# Patient Record
Sex: FEMALE | Race: WHITE | NOT HISPANIC OR LATINO
[De-identification: names, ages, dates, MRNs, and addresses within clinical notes are randomized per-mention and may not be internally consistent; named-entity substitution may affect disease eponyms.]

---

## 2017-08-21 ENCOUNTER — APPOINTMENT (OUTPATIENT)
Dept: OPHTHALMOLOGY | Facility: CLINIC | Age: 77
End: 2017-08-21

## 2018-08-01 ENCOUNTER — APPOINTMENT (OUTPATIENT)
Dept: SURGERY | Facility: ASSISTED LIVING FACILITY | Age: 78
End: 2018-08-01
Payer: MEDICARE

## 2018-08-01 PROCEDURE — 99304 1ST NF CARE SF/LOW MDM 25: CPT

## 2018-08-10 ENCOUNTER — APPOINTMENT (OUTPATIENT)
Dept: SURGERY | Facility: ASSISTED LIVING FACILITY | Age: 78
End: 2018-08-10
Payer: MEDICARE

## 2018-08-10 PROCEDURE — 99307 SBSQ NF CARE SF MDM 10: CPT

## 2018-08-15 ENCOUNTER — APPOINTMENT (OUTPATIENT)
Dept: SURGERY | Facility: ASSISTED LIVING FACILITY | Age: 78
End: 2018-08-15
Payer: MEDICARE

## 2018-08-15 PROCEDURE — 99307 SBSQ NF CARE SF MDM 10: CPT

## 2018-09-24 ENCOUNTER — APPOINTMENT (OUTPATIENT)
Dept: NEUROLOGY | Facility: CLINIC | Age: 78
End: 2018-09-24

## 2018-09-26 ENCOUNTER — OUTPATIENT (OUTPATIENT)
Dept: OUTPATIENT SERVICES | Facility: HOSPITAL | Age: 78
LOS: 1 days | End: 2018-09-26
Payer: MEDICARE

## 2018-09-26 ENCOUNTER — APPOINTMENT (OUTPATIENT)
Dept: CT IMAGING | Facility: HOSPITAL | Age: 78
End: 2018-09-26
Payer: MEDICARE

## 2018-09-26 DIAGNOSIS — Z90.710 ACQUIRED ABSENCE OF BOTH CERVIX AND UTERUS: Chronic | ICD-10-CM

## 2018-09-26 DIAGNOSIS — Z95.0 PRESENCE OF CARDIAC PACEMAKER: Chronic | ICD-10-CM

## 2018-09-26 DIAGNOSIS — Z98.89 OTHER SPECIFIED POSTPROCEDURAL STATES: Chronic | ICD-10-CM

## 2018-09-26 DIAGNOSIS — Z00.8 ENCOUNTER FOR OTHER GENERAL EXAMINATION: ICD-10-CM

## 2018-09-26 DIAGNOSIS — Z90.49 ACQUIRED ABSENCE OF OTHER SPECIFIED PARTS OF DIGESTIVE TRACT: Chronic | ICD-10-CM

## 2018-09-26 DIAGNOSIS — Z95.4 PRESENCE OF OTHER HEART-VALVE REPLACEMENT: Chronic | ICD-10-CM

## 2018-09-26 PROCEDURE — 70450 CT HEAD/BRAIN W/O DYE: CPT

## 2018-09-26 PROCEDURE — 70450 CT HEAD/BRAIN W/O DYE: CPT | Mod: 26

## 2019-01-11 ENCOUNTER — NON-APPOINTMENT (OUTPATIENT)
Age: 79
End: 2019-01-11

## 2019-01-11 ENCOUNTER — APPOINTMENT (OUTPATIENT)
Dept: ELECTROPHYSIOLOGY | Facility: CLINIC | Age: 79
End: 2019-01-11
Payer: MEDICARE

## 2019-01-11 VITALS — SYSTOLIC BLOOD PRESSURE: 125 MMHG | HEART RATE: 65 BPM | DIASTOLIC BLOOD PRESSURE: 71 MMHG | RESPIRATION RATE: 14 BRPM

## 2019-01-11 VITALS — OXYGEN SATURATION: 100 % | BODY MASS INDEX: 26.21 KG/M2 | WEIGHT: 130 LBS | HEIGHT: 59 IN

## 2019-01-11 DIAGNOSIS — Z95.0 PRESENCE OF CARDIAC PACEMAKER: ICD-10-CM

## 2019-01-11 DIAGNOSIS — I48.91 UNSPECIFIED ATRIAL FIBRILLATION: ICD-10-CM

## 2019-01-11 DIAGNOSIS — Z45.010 ENCOUNTER FOR CHECKING AND TESTING OF CARDIAC PACEMAKER PULSE GENERATOR [BATTERY]: ICD-10-CM

## 2019-01-11 DIAGNOSIS — Z86.79 PERSONAL HISTORY OF OTHER DISEASES OF THE CIRCULATORY SYSTEM: ICD-10-CM

## 2019-01-11 DIAGNOSIS — I48.2 CHRONIC ATRIAL FIBRILLATION: ICD-10-CM

## 2019-01-11 DIAGNOSIS — I61.9 NONTRAUMATIC INTRACEREBRAL HEMORRHAGE, UNSPECIFIED: ICD-10-CM

## 2019-01-11 PROCEDURE — 99205 OFFICE O/P NEW HI 60 MIN: CPT

## 2019-01-11 PROCEDURE — 93279 PRGRMG DEV EVAL PM/LDLS PM: CPT

## 2019-01-11 PROCEDURE — 93000 ELECTROCARDIOGRAM COMPLETE: CPT | Mod: 59

## 2019-01-11 RX ORDER — UBIDECARENONE/VIT E ACET 100MG-5
CAPSULE ORAL
Refills: 0 | Status: ACTIVE | COMMUNITY

## 2019-01-11 RX ORDER — TRAMADOL HYDROCHLORIDE 25 MG/1
TABLET, COATED ORAL
Refills: 0 | Status: ACTIVE | COMMUNITY

## 2019-01-12 NOTE — DISCUSSION/SUMMARY
[FreeTextEntry1] : In summary, Dariana Galeana is a 79y/o woman with Hx of HTN, HLD, hypothyroidism, rheumatic fever, bioprosthetic AVR and MVR, annuloplasty ring in the tricuspid valve position (TV repair), normal LV function, epicardial pacemaker implant at time of MVR which has since been changed and lead capped, and permanent atrial fibrillation, not on AC secondary to cerebral hemorrhage (4/2018- was previously on warfarin), who presents today for initial evaluation and device check as PPM has reached DONNA as of 10/26/2018. Admits doing well, was residing in a Nursing Home secondary to her CVA. Has been off warfarin since that time and is following with Neuro surgeon. Residual right sided weakness leaving her wheelchair bound, needs assistance for ADLs. From a cardiac standpoint she feels well. Denies chest pain, palpitations, SOB, syncope or near syncope. EKG today shows atrial fibrillation with ventricular pacing. V pacing > 70% of the time although ECHO performed yesterday without indication for upgrade. Recommend undergoing routine single chamber PPM gen change. Risks, benefits, and alternatives to procedure discussed at length. Also expressed interest in Watchman procedure. Will be seeing Neuro surgery to discuss clearance for use of short term AC and ASA/clopidogrel. If clearance provided, will discuss Watchman procedure further. \par \par Sincerely,\par \par Aram Duncan MD

## 2019-01-12 NOTE — HISTORY OF PRESENT ILLNESS
[FreeTextEntry1] : Eugene Rubio MD\par \par I saw Dariana Galeana on January 11, 2019. As you know, she is a 79y/o woman with Hx of HTN, HLD, hypothyroidism, rheumatic fever, bioprosthetic AVR and MVR, annuloplasty ring in the tricuspid valve position (TV repair), normal LV function, epicardial pacemaker implant at time of MVR which has since been changed and lead capped, and permanent atrial fibrillation, not on AC secondary to cerebral hemorrhage (4/2018- was previously on warfarin), who presents today for initial evaluation and device check as PPM has reached DONNA as of 10/26/2018. Admits doing well, was residing in a Nursing Home secondary to her CVA. Has been off warfarin since that time and is following with Neuro surgeon. Residual right sided weakness leaving her wheelchair bound, needs assistance for ADLs. From a cardiac standpoint she feels well. Denies chest pain, palpitations, SOB, syncope or near syncope.

## 2019-01-12 NOTE — PHYSICAL EXAM
[Heart Sounds] : normal S1 and S2 [Respiration, Rhythm And Depth] : normal respiratory rhythm and effort [Exaggerated Use Of Accessory Muscles For Inspiration] : no accessory muscle use [General Appearance - Well Developed] : well developed [Normal Appearance] : normal appearance [Well Groomed] : well groomed [General Appearance - Well Nourished] : well nourished [No Deformities] : no deformities [General Appearance - In No Acute Distress] : no acute distress [Normal Conjunctiva] : the conjunctiva exhibited no abnormalities [Eyelids - No Xanthelasma] : the eyelids demonstrated no xanthelasmas [Normal Oral Mucosa] : normal oral mucosa [No Oral Pallor] : no oral pallor [No Oral Cyanosis] : no oral cyanosis [Normal Jugular Venous A Waves Present] : normal jugular venous A waves present [Normal Jugular Venous V Waves Present] : normal jugular venous V waves present [No Jugular Venous Serrano A Waves] : no jugular venous serrano A waves [Abdomen Soft] : soft [Abdomen Tenderness] : non-tender [Abdomen Mass (___ Cm)] : no abdominal mass palpated [Abnormal Walk] : normal gait [Gait - Sufficient For Exercise Testing] : the gait was sufficient for exercise testing [Nail Clubbing] : no clubbing of the fingernails [Cyanosis, Localized] : no localized cyanosis [Petechial Hemorrhages (___cm)] : no petechial hemorrhages [Skin Color & Pigmentation] : normal skin color and pigmentation [] : no rash [No Venous Stasis] : no venous stasis [Skin Lesions] : no skin lesions [No Skin Ulcers] : no skin ulcer [No Xanthoma] : no  xanthoma was observed [Oriented To Time, Place, And Person] : oriented to person, place, and time [Affect] : the affect was normal [Mood] : the mood was normal [No Anxiety] : not feeling anxious [FreeTextEntry1] : diminished at bases

## 2019-01-25 ENCOUNTER — APPOINTMENT (OUTPATIENT)
Dept: ELECTROPHYSIOLOGY | Facility: CLINIC | Age: 79
End: 2019-01-25
Payer: MEDICARE

## 2019-01-25 PROCEDURE — 36415 COLL VENOUS BLD VENIPUNCTURE: CPT

## 2019-01-27 LAB
ALBUMIN SERPL ELPH-MCNC: 4.5 G/DL
ALP BLD-CCNC: 70 U/L
ALT SERPL-CCNC: 11 U/L
ANION GAP SERPL CALC-SCNC: 17 MMOL/L
AST SERPL-CCNC: 16 U/L
BASOPHILS # BLD AUTO: 0.04 K/UL
BASOPHILS NFR BLD AUTO: 0.5 %
BILIRUB SERPL-MCNC: 0.8 MG/DL
BUN SERPL-MCNC: 14 MG/DL
CALCIUM SERPL-MCNC: 10 MG/DL
CHLORIDE SERPL-SCNC: 95 MMOL/L
CO2 SERPL-SCNC: 28 MMOL/L
CREAT SERPL-MCNC: 1.01 MG/DL
EOSINOPHIL # BLD AUTO: 0.33 K/UL
EOSINOPHIL NFR BLD AUTO: 4.3 %
HCT VFR BLD CALC: 43.4 %
HGB BLD-MCNC: 13.6 G/DL
IMM GRANULOCYTES NFR BLD AUTO: 0.5 %
LYMPHOCYTES # BLD AUTO: 1.3 K/UL
LYMPHOCYTES NFR BLD AUTO: 17 %
MAN DIFF?: NORMAL
MCHC RBC-ENTMCNC: 30.2 PG
MCHC RBC-ENTMCNC: 31.3 GM/DL
MCV RBC AUTO: 96.2 FL
MONOCYTES # BLD AUTO: 0.59 K/UL
MONOCYTES NFR BLD AUTO: 7.7 %
NEUTROPHILS # BLD AUTO: 5.35 K/UL
NEUTROPHILS NFR BLD AUTO: 70 %
PLATELET # BLD AUTO: 362 K/UL
POTASSIUM SERPL-SCNC: 4.1 MMOL/L
PROT SERPL-MCNC: 7.7 G/DL
RBC # BLD: 4.51 M/UL
RBC # FLD: 14.1 %
SODIUM SERPL-SCNC: 140 MMOL/L
WBC # FLD AUTO: 7.65 K/UL

## 2019-01-30 ENCOUNTER — INPATIENT (INPATIENT)
Facility: HOSPITAL | Age: 79
LOS: 1 days | Discharge: HOME HEALTH | End: 2019-02-01
Attending: INTERNAL MEDICINE | Admitting: INTERNAL MEDICINE
Payer: MEDICARE

## 2019-01-30 VITALS
SYSTOLIC BLOOD PRESSURE: 119 MMHG | OXYGEN SATURATION: 97 % | TEMPERATURE: 98 F | RESPIRATION RATE: 18 BRPM | HEART RATE: 71 BPM | DIASTOLIC BLOOD PRESSURE: 53 MMHG

## 2019-01-30 DIAGNOSIS — Z90.49 ACQUIRED ABSENCE OF OTHER SPECIFIED PARTS OF DIGESTIVE TRACT: Chronic | ICD-10-CM

## 2019-01-30 DIAGNOSIS — Z98.89 OTHER SPECIFIED POSTPROCEDURAL STATES: Chronic | ICD-10-CM

## 2019-01-30 DIAGNOSIS — I48.2 CHRONIC ATRIAL FIBRILLATION: ICD-10-CM

## 2019-01-30 DIAGNOSIS — Z90.710 ACQUIRED ABSENCE OF BOTH CERVIX AND UTERUS: Chronic | ICD-10-CM

## 2019-01-30 DIAGNOSIS — Z95.4 PRESENCE OF OTHER HEART-VALVE REPLACEMENT: Chronic | ICD-10-CM

## 2019-01-30 DIAGNOSIS — Z95.0 PRESENCE OF CARDIAC PACEMAKER: Chronic | ICD-10-CM

## 2019-01-30 PROCEDURE — 93010 ELECTROCARDIOGRAM REPORT: CPT | Mod: 76

## 2019-01-30 PROCEDURE — 33228 REMV&REPLC PM GEN DUAL LEAD: CPT

## 2019-01-30 RX ORDER — SODIUM CHLORIDE 9 MG/ML
3 INJECTION INTRAMUSCULAR; INTRAVENOUS; SUBCUTANEOUS EVERY 8 HOURS
Qty: 0 | Refills: 0 | Status: DISCONTINUED | OUTPATIENT
Start: 2019-01-30 | End: 2019-02-01

## 2019-01-30 RX ORDER — VANCOMYCIN HCL 1 G
1000 VIAL (EA) INTRAVENOUS ONCE
Qty: 0 | Refills: 0 | Status: COMPLETED | OUTPATIENT
Start: 2019-01-31 | End: 2019-01-31

## 2019-01-30 RX ORDER — DIGOXIN 250 MCG
0.12 TABLET ORAL DAILY
Qty: 0 | Refills: 0 | Status: DISCONTINUED | OUTPATIENT
Start: 2019-01-30 | End: 2019-02-01

## 2019-01-30 RX ORDER — FUROSEMIDE 40 MG
40 TABLET ORAL DAILY
Qty: 0 | Refills: 0 | Status: DISCONTINUED | OUTPATIENT
Start: 2019-01-30 | End: 2019-02-01

## 2019-01-30 RX ORDER — ZOLPIDEM TARTRATE 10 MG/1
5 TABLET ORAL AT BEDTIME
Qty: 0 | Refills: 0 | Status: DISCONTINUED | OUTPATIENT
Start: 2019-01-30 | End: 2019-02-01

## 2019-01-30 RX ADMIN — SODIUM CHLORIDE 3 MILLILITER(S): 9 INJECTION INTRAMUSCULAR; INTRAVENOUS; SUBCUTANEOUS at 21:39

## 2019-01-30 RX ADMIN — SODIUM CHLORIDE 3 MILLILITER(S): 9 INJECTION INTRAMUSCULAR; INTRAVENOUS; SUBCUTANEOUS at 17:25

## 2019-01-30 RX ADMIN — ZOLPIDEM TARTRATE 5 MILLIGRAM(S): 10 TABLET ORAL at 21:39

## 2019-01-30 NOTE — CHART NOTE - NSCHARTNOTEFT_GEN_A_CORE
Patient is s/p PPM generator change. Device site with no bleeding or hematoma. Device teaching given to khanh and she demonstrates understanding of the instructions. F/U appointment with device clinic on 2/12/19 @ 2:15 pm

## 2019-01-30 NOTE — CHART NOTE - NSCHARTNOTEFT_GEN_A_CORE
Type of Procedure: pacemaker generator change (single chamber)  Licensed independent practitioner: Aram Duncan MD  Assistant: none  Description of procedure: sterile conditions, antibiotic coverage, old pacemaker removed, pocket copiously irrigated with antibiotic solution, new pacemaker implanted and pocket closed in 3 layers.   Findings of procedure: normal pacing, sensing and lead integrity  Estimated blood loss: < 10 cc  Specimen removed: old battery depleted pacemaker  Preoperative Dx: AV block; PM at DONNA  Postoperative Dx: AV block; PM at DONNA; pacemaker replaced  Complications: none  Anesthesia type: local anesthesia with sedation  No heparin for 24 hours and then reassess.    Aram Duncan MD.

## 2019-01-30 NOTE — H&P CARDIOLOGY - FAMILY HISTORY
Father  Still living? No  Family history of rheumatic fever, Age at diagnosis: Age Unknown     Mother  Still living? No  Family history of stroke, Age at diagnosis: Age Unknown

## 2019-01-30 NOTE — H&P CARDIOLOGY - PSH
Artificial cardiac pacemaker  2009.  Pacemaker card on chart  S/P cholecystectomy    S/P hysterectomy    S/P mitral valve repair  1988  S/P mitral valve replacement with tissue valve  bovine 2009  At same time aortic and tricuspid valves were replaced.

## 2019-01-30 NOTE — H&P CARDIOLOGY - HISTORY OF PRESENT ILLNESS
Patient is a 78 year old woman that arrived today for PPM generator change. Please see full hard copy H&P in chart.

## 2019-01-30 NOTE — H&P CARDIOLOGY - PMH
Abnormal Pap smear of vagina  treated with partial LISA  Adult Hypothyroidism    Arthritis    Atrial Fibrillation    Bradycardia    CHF (congestive heart failure), NYHA class II, chronic, systolic    Hip pain, acute, right    HTN    Rheumatic fever with heart involvement    Transient cerebral ischemia, unspecified type  symptom is vertigo, no residual. Most recent TIA 2/2016

## 2019-01-30 NOTE — CHART NOTE - NSCHARTNOTEFT_GEN_A_CORE
Patient seen and examined   T(C): 36.5 (01-30-19 @ 21:00), Max: 36.7 (01-30-19 @ 19:09)  HR: 71 (01-30-19 @ 21:00) (71 - 71)  BP: 108/50 (01-30-19 @ 21:00) (108/50 - 119/53)  RR: 18 (01-30-19 @ 21:00) (18 - 18)  SpO2: 96% (01-30-19 @ 21:00) (96% - 97%)    Patient s/p PPM generator change / implant.   Patient denies any complaints.    Left subclavicular incision site stable. Dressing clean, dry and intact. No hematoma or active bleeding noted.     Patient hemodynamically stable will continue to monitor

## 2019-01-31 ENCOUNTER — TRANSCRIPTION ENCOUNTER (OUTPATIENT)
Age: 79
End: 2019-01-31

## 2019-01-31 LAB
ANION GAP SERPL CALC-SCNC: 14 MMO/L — SIGNIFICANT CHANGE UP (ref 7–14)
BUN SERPL-MCNC: 18 MG/DL — SIGNIFICANT CHANGE UP (ref 7–23)
CALCIUM SERPL-MCNC: 9.7 MG/DL — SIGNIFICANT CHANGE UP (ref 8.4–10.5)
CHLORIDE SERPL-SCNC: 98 MMOL/L — SIGNIFICANT CHANGE UP (ref 98–107)
CO2 SERPL-SCNC: 24 MMOL/L — SIGNIFICANT CHANGE UP (ref 22–31)
CREAT SERPL-MCNC: 0.96 MG/DL — SIGNIFICANT CHANGE UP (ref 0.5–1.3)
GLUCOSE BLDC GLUCOMTR-MCNC: 138 MG/DL — HIGH (ref 70–99)
GLUCOSE SERPL-MCNC: 110 MG/DL — HIGH (ref 70–99)
HCT VFR BLD CALC: 37.7 % — SIGNIFICANT CHANGE UP (ref 34.5–45)
HGB BLD-MCNC: 12 G/DL — SIGNIFICANT CHANGE UP (ref 11.5–15.5)
MAGNESIUM SERPL-MCNC: 2.2 MG/DL — SIGNIFICANT CHANGE UP (ref 1.6–2.6)
MCHC RBC-ENTMCNC: 30.3 PG — SIGNIFICANT CHANGE UP (ref 27–34)
MCHC RBC-ENTMCNC: 31.8 % — LOW (ref 32–36)
MCV RBC AUTO: 95.2 FL — SIGNIFICANT CHANGE UP (ref 80–100)
NRBC # FLD: 0 K/UL — LOW (ref 25–125)
PLATELET # BLD AUTO: 278 K/UL — SIGNIFICANT CHANGE UP (ref 150–400)
PMV BLD: 10.2 FL — SIGNIFICANT CHANGE UP (ref 7–13)
POTASSIUM SERPL-MCNC: 4.9 MMOL/L — SIGNIFICANT CHANGE UP (ref 3.5–5.3)
POTASSIUM SERPL-SCNC: 4.9 MMOL/L — SIGNIFICANT CHANGE UP (ref 3.5–5.3)
RBC # BLD: 3.96 M/UL — SIGNIFICANT CHANGE UP (ref 3.8–5.2)
RBC # FLD: 12.8 % — SIGNIFICANT CHANGE UP (ref 10.3–14.5)
SODIUM SERPL-SCNC: 136 MMOL/L — SIGNIFICANT CHANGE UP (ref 135–145)
WBC # BLD: 12.35 K/UL — HIGH (ref 3.8–10.5)
WBC # FLD AUTO: 12.35 K/UL — HIGH (ref 3.8–10.5)

## 2019-01-31 RX ORDER — DIGOXIN 250 MCG
1 TABLET ORAL
Qty: 30 | Refills: 0 | OUTPATIENT
Start: 2019-01-31 | End: 2019-03-01

## 2019-01-31 RX ORDER — FUROSEMIDE 40 MG
1 TABLET ORAL
Qty: 0 | Refills: 0 | COMMUNITY
Start: 2019-01-31

## 2019-01-31 RX ORDER — FUROSEMIDE 40 MG
1 TABLET ORAL
Qty: 30 | Refills: 0 | OUTPATIENT
Start: 2019-01-31 | End: 2019-03-01

## 2019-01-31 RX ORDER — DIGOXIN 250 MCG
1 TABLET ORAL
Qty: 0 | Refills: 0 | COMMUNITY
Start: 2019-01-31

## 2019-01-31 RX ORDER — ACETAMINOPHEN 500 MG
2 TABLET ORAL
Qty: 0 | Refills: 0 | COMMUNITY

## 2019-01-31 RX ADMIN — ZOLPIDEM TARTRATE 5 MILLIGRAM(S): 10 TABLET ORAL at 21:22

## 2019-01-31 RX ADMIN — SODIUM CHLORIDE 3 MILLILITER(S): 9 INJECTION INTRAMUSCULAR; INTRAVENOUS; SUBCUTANEOUS at 21:23

## 2019-01-31 RX ADMIN — Medication 250 MILLIGRAM(S): at 02:05

## 2019-01-31 RX ADMIN — Medication 40 MILLIGRAM(S): at 05:59

## 2019-01-31 RX ADMIN — SODIUM CHLORIDE 3 MILLILITER(S): 9 INJECTION INTRAMUSCULAR; INTRAVENOUS; SUBCUTANEOUS at 05:59

## 2019-01-31 RX ADMIN — Medication 0.12 MILLIGRAM(S): at 05:59

## 2019-01-31 RX ADMIN — SODIUM CHLORIDE 3 MILLILITER(S): 9 INJECTION INTRAMUSCULAR; INTRAVENOUS; SUBCUTANEOUS at 13:00

## 2019-01-31 NOTE — PROGRESS NOTE ADULT - ASSESSMENT
78 year old female with a PMH of permanent AFib-off Warfarin due to cerebral hemorrhage 4/2018, HTN, HLD, hypothyroidism, rheumatic fever, bioprosthetic AVR/MVR, annuloplasty ring in tricuspid valve, epicardial pacemaker at time of MVR with lead change presented for elective generator change.  s/p Medtronic PPM generator change.  Patient's [id number] is 6, however her anticoagulation therapy was discontinued secondary to cerebral hemorrhage in 2018.  -Post device teaching done  - Follow up in the device clinic on 2/12 at 2:15pm.  Oncology Building 4 th Floor at [facility]  [phone].    -Continue home meds  -Follow up with her neuro surgery to discuss clearance for use of short term anticoagulation therapy and ASA/Plavix for WATCHMAN procedure  -Likely discharge home tonight 78 year old female with a PMH of permanent AFib-off Warfarin due to cerebral hemorrhage 4/2018, HTN, HLD, hypothyroidism, rheumatic fever, bioprosthetic AVR/MVR, annuloplasty ring in tricuspid valve, epicardial pacemaker at time of MVR with lead change presented for elective generator change.  s/p Medtronic PPM generator change.  Patient's [id number] is 6, however her anticoagulation therapy was discontinued secondary to cerebral hemorrhage in 2018.  -Post device teaching done  - Follow up in the device clinic on 2/12 at 2:15pm.  Oncology Building 4 th Floor at [facility]  [phone].    -Continue home meds  -Follow up with her neuro surgery as an outpatient to discuss clearance for use of short term anticoagulation therapy and ASA/Plavix for WATCHMAN procedure  -Stable for discharge home tonight per Dr. Duncan

## 2019-01-31 NOTE — DISCHARGE NOTE ADULT - PATIENT PORTAL LINK FT
You can access the "[employer] Patient Portal, offered by [facility], by registering with the following website: [email or web address]

## 2019-01-31 NOTE — DISCHARGE NOTE ADULT - ADDITIONAL INSTRUCTIONS
Follow with PMD dr Duncan within 1 week. Call to confirm appointment for device clinic on  2/12/19 at 215 pm. Call [phone] to confirm appointment  Take medications as prescribed Follow with PMD dr Duncan within 1 week. Call to confirm appointment for device clinic on  2/12/19 at 215 pm. Call [phone] to confirm appointment  Take medications as prescribed  Follow up with a neurosurgeon as outpatient. You need to discuss the use of anticoagulation and use of Aspirin and Plavix for watchman procedure.

## 2019-01-31 NOTE — DISCHARGE NOTE ADULT - MEDICATION SUMMARY - MEDICATIONS TO TAKE
I will START or STAY ON the medications listed below when I get home from the hospital:    Tylenol 325 mg oral capsule  -- 2 cap(s) by mouth every 6 hours, As Needed  -- Indication: For pain    digoxin 125 mcg (0.125 mg) oral tablet  -- 1 tab(s) by mouth once a day  -- Indication: For Chronic atrial fibrillation    zolpidem 5 mg oral tablet  -- 1 tab(s) by mouth once a day (at bedtime)  -- Indication: For sleep    furosemide 40 mg oral tablet  -- 1 tab(s) by mouth once a day  -- Indication: For ChF

## 2019-01-31 NOTE — DISCHARGE NOTE ADULT - MEDICATION SUMMARY - MEDICATIONS TO STOP TAKING
I will STOP taking the medications listed below when I get home from the hospital:    traMADol 50 mg oral tablet  -- 1 tab(s) by mouth every 6 hours, As needed, Severe Pain (7 - 10)

## 2019-01-31 NOTE — DISCHARGE NOTE ADULT - HOSPITAL COURSE
Patient is a 78 year old woman with PMHx of HTN,HLD, hypothyroid, biprosthetic AVR and MVR annuloplasty ring in tricuspid valve position arrived today for PPM generator change   Pacemaker generator change done 1/30/19.

## 2019-01-31 NOTE — CHART NOTE - NSCHARTNOTEFT_GEN_A_CORE
Telemetry review done: NSR without recurrent SVT seen.  Patient will be provided a follow up with Dr. Koch upon discharge.

## 2019-01-31 NOTE — DISCHARGE NOTE ADULT - CARE PROVIDER_API CALL
Aram Duncan (MD)  Cardiac Electrophysiology; Cardiovascular Disease; Internal Medicine  [address] [address] [place] [address], [address] [place] [address]  Phone: [phone]  Fax: [phone]

## 2019-01-31 NOTE — DISCHARGE NOTE ADULT - SECONDARY DIAGNOSIS.
HTN (hypertension) CHF (congestive heart failure), NYHA class II, chronic, systolic Arthritis Transient cerebral ischemia, unspecified type Atrial fibrillation Adult hypothyroidism

## 2019-01-31 NOTE — DISCHARGE NOTE ADULT - CARE PLAN
Principal Discharge DX:	Bradycardia  Goal:	satisfactory heart rate  Assessment and plan of treatment:	Follow up with PMD within 1 week. Call for appointment  Secondary Diagnosis:	Adult hypothyroidism  Assessment and plan of treatment:	Follow with PMD  Secondary Diagnosis:	HTN (hypertension)  Assessment and plan of treatment:	Follow with PMD  Secondary Diagnosis:	CHF (congestive heart failure), NYHA class II, chronic, systolic  Assessment and plan of treatment:	Follow with PMD  Secondary Diagnosis:	Arthritis  Assessment and plan of treatment:	Follow with PMD  Secondary Diagnosis:	Transient cerebral ischemia, unspecified type  Assessment and plan of treatment:	Follow with PMD  Secondary Diagnosis:	Atrial fibrillation  Assessment and plan of treatment:	Follow with PMD

## 2019-02-01 VITALS
RESPIRATION RATE: 18 BRPM | DIASTOLIC BLOOD PRESSURE: 80 MMHG | SYSTOLIC BLOOD PRESSURE: 130 MMHG | TEMPERATURE: 98 F | OXYGEN SATURATION: 99 % | HEART RATE: 85 BPM

## 2019-02-01 LAB
ANION GAP SERPL CALC-SCNC: 15 MMO/L — HIGH (ref 7–14)
BUN SERPL-MCNC: 21 MG/DL — SIGNIFICANT CHANGE UP (ref 7–23)
CALCIUM SERPL-MCNC: 9.7 MG/DL — SIGNIFICANT CHANGE UP (ref 8.4–10.5)
CHLORIDE SERPL-SCNC: 97 MMOL/L — LOW (ref 98–107)
CO2 SERPL-SCNC: 29 MMOL/L — SIGNIFICANT CHANGE UP (ref 22–31)
CREAT SERPL-MCNC: 1.06 MG/DL — SIGNIFICANT CHANGE UP (ref 0.5–1.3)
GLUCOSE SERPL-MCNC: 98 MG/DL — SIGNIFICANT CHANGE UP (ref 70–99)
HCT VFR BLD CALC: 39.8 % — SIGNIFICANT CHANGE UP (ref 34.5–45)
HGB BLD-MCNC: 13.2 G/DL — SIGNIFICANT CHANGE UP (ref 11.5–15.5)
MCHC RBC-ENTMCNC: 30.6 PG — SIGNIFICANT CHANGE UP (ref 27–34)
MCHC RBC-ENTMCNC: 33.2 % — SIGNIFICANT CHANGE UP (ref 32–36)
MCV RBC AUTO: 92.3 FL — SIGNIFICANT CHANGE UP (ref 80–100)
NRBC # FLD: 0 K/UL — LOW (ref 25–125)
PLATELET # BLD AUTO: 305 K/UL — SIGNIFICANT CHANGE UP (ref 150–400)
PMV BLD: 10.4 FL — SIGNIFICANT CHANGE UP (ref 7–13)
POTASSIUM SERPL-MCNC: 3.6 MMOL/L — SIGNIFICANT CHANGE UP (ref 3.5–5.3)
POTASSIUM SERPL-SCNC: 3.6 MMOL/L — SIGNIFICANT CHANGE UP (ref 3.5–5.3)
RBC # BLD: 4.31 M/UL — SIGNIFICANT CHANGE UP (ref 3.8–5.2)
RBC # FLD: 13.2 % — SIGNIFICANT CHANGE UP (ref 10.3–14.5)
SODIUM SERPL-SCNC: 141 MMOL/L — SIGNIFICANT CHANGE UP (ref 135–145)
WBC # BLD: 8.84 K/UL — SIGNIFICANT CHANGE UP (ref 3.8–10.5)
WBC # FLD AUTO: 8.84 K/UL — SIGNIFICANT CHANGE UP (ref 3.8–10.5)

## 2019-02-01 PROCEDURE — 70450 CT HEAD/BRAIN W/O DYE: CPT | Mod: 26

## 2019-02-01 RX ADMIN — SODIUM CHLORIDE 3 MILLILITER(S): 9 INJECTION INTRAMUSCULAR; INTRAVENOUS; SUBCUTANEOUS at 05:29

## 2019-02-01 RX ADMIN — Medication 0.12 MILLIGRAM(S): at 05:30

## 2019-02-01 RX ADMIN — Medication 40 MILLIGRAM(S): at 05:30

## 2019-02-01 NOTE — PROGRESS NOTE ADULT - SUBJECTIVE AND OBJECTIVE BOX
Patient complaining of dizziness this am. Patient confused and disoriented this am. Patient states that she had symptoms like this when she had her previous CVA. Patient states she had these symptoms like this yesterday also. Spoke with EPS regarding symptoms. Will obtain CT scan head to r/o pathology prior to discharge later today.

## 2019-02-01 NOTE — PROGRESS NOTE ADULT - SUBJECTIVE AND OBJECTIVE BOX
Patient is a 78y old  Female who presents with a chief complaint of PPM generator change (31 Jan 2019 17:02)  Patient reports she had an argument with one of staff this am.  Fox staley RN reports patient was confused during the night.  Patient tried to call police.  Alert oriented x3. No  Denies SOB/CP/palpitations. Patient claims she takes Ambien 2mg every night for insomnia.  No acute agitations at present time.     PAST MEDICAL & SURGICAL HISTORY:  Arthritis  Hip pain, acute, right  Abnormal Pap smear of vagina: treated with partial LISA  CHF (congestive heart failure), NYHA class II, chronic, systolic  Transient cerebral ischemia, unspecified type: symptom is vertigo, no residual. Most recent TIA 2/2016  Bradycardia  Rheumatic fever with heart involvement  s/p Mitral Valve Repair: 1988  H/O: Hysterectomy  History of Cholecystectomy  History of Cholecystectomy  s/p Mitral Valve Repair  H/O: Hysterectomy  History of Cholecystectomy  FH: Mitral Valve Repair  H/O: Hysterectomy  Adult Hypothyroidism  Atrial Fibrillation  HTN  Artificial cardiac pacemaker: 2009.  Pacemaker card on chart  S/P cholecystectomy  S/P mitral valve replacement with tissue valve: bovine 2009  At same time aortic and tricuspid valves were replaced.  S/P mitral valve repair: 1988  S/P hysterectomy      MEDICATIONS  (STANDING):  digoxin     Tablet 0.125 milliGRAM(s) Oral daily  furosemide    Tablet 40 milliGRAM(s) Oral daily  sodium chloride 0.9% lock flush 3 milliLiter(s) IV Push every 8 hours    MEDICATIONS  (PRN):  zolpidem 5 milliGRAM(s) Oral at bedtime PRN Insomnia            Vital Signs Last 24 Hrs  T(C): 36.5 (01 Feb 2019 05:28), Max: 37.1 (31 Jan 2019 11:37)  T(F): 97.7 (01 Feb 2019 05:28), Max: 98.8 (31 Jan 2019 11:37)  HR: 79 (01 Feb 2019 05:28) (74 - 79)  BP: 127/75 (01 Feb 2019 05:28) (108/59 - 145/83)  BP(mean): --  RR: 18 (01 Feb 2019 05:28) (16 - 18)  SpO2: 99% (01 Feb 2019 05:28) (96% - 99%)            INTERPRETATION OF TELEMETRY:  AF V paced 70's bpm    ECG:        LABS:                        13.2   8.84  )-----------( 305      ( 01 Feb 2019 06:30 )             39.8     02-01    141  |  97<L>  |  21  ----------------------------<  98  3.6   |  29  |  1.06    Ca    9.7      01 Feb 2019 06:30  Mg     2.2     01-31                BNP  RADIOLOGY & ADDITIONAL STUDIES:      PHYSICAL EXAM:    GENERAL: In no apparent distress, well nourished, and hydrated.  NECK: Supple and normal thyroid.  No JVD or carotid bruit.  Carotid pulse is 2+ bilaterally.  HEART: S1S2 irregularly irregular ; 2/6 murmurs, no rubs, or gallops.  Left side ACW incision site with steri strips dry intact, no active bleeding or hematoma   PULMONARY: Clear to auscultation and perfusion.  No rales, wheezing, or rhonchi bilaterally.  ABDOMEN: Soft, Nontender, Nondistended; Bowel sounds present  EXTREMITIES:  2+ Peripheral Pulses, No clubbing, cyanosis, or edema  NEUROLOGICAL: alert oriented x3, +R side weakness, baseline speech not fluent from old CVA

## 2019-02-01 NOTE — PROGRESS NOTE ADULT - ASSESSMENT
78 year old female with a PMH of permanent AFib-off Warfarin due to cerebral hemorrhage 4/2018, HTN, HLD, hypothyroidism, rheumatic fever, bioprosthetic AVR/MVR, annuloplasty ring in tricuspid valve, epicardial pacemaker at time of MVR with lead change presented for elective generator change.  s/p Medtronic PPM generator change.  Patient's [id number] is 6, however her anticoagulation therapy was discontinued secondary to cerebral hemorrhage in 2018.  Discharge was postponed-family unable to pick her up yesterday.  Overnight patient developed delirium, possibly associated with Ambien use.  No acute neurological deficit seen compare 1/31.   -CT of head to r/o acute pathology  - Follow up in the device clinic on 2/12 at 2:15pm.  Oncology Building 4 th Floor at [facility]  [phone].    -Continue home meds, hold off Ambien   -Follow up with her neuro surgery as an outpatient to discuss clearance for use of short term anticoagulation therapy and ASA/Plavix for WATCHMAN procedure  -Plan for discharge home today after CT of head

## 2019-02-12 ENCOUNTER — APPOINTMENT (OUTPATIENT)
Dept: ELECTROPHYSIOLOGY | Facility: CLINIC | Age: 79
End: 2019-02-12

## 2019-02-20 NOTE — PROGRESS NOTE ADULT - SUBJECTIVE AND OBJECTIVE BOX
Patient is a 78y old  Female who presents with a chief complaint of PPM generator change (31 Jan 2019 12:30)    Mild PPM wound site pain.  Expressed "feeling confused" with able to explain why.  She is worried about her stroke risk as she is not on anticoagulation therapy for AFib.    PAST MEDICAL & SURGICAL HISTORY:  Arthritis  Hip pain, acute, right  Abnormal Pap smear of vagina: treated with partial LISA  CHF (congestive heart failure), NYHA class II, chronic, systolic  Transient cerebral ischemia, unspecified type: symptom is vertigo, no residual. Most recent TIA 2/2016  Bradycardia  Rheumatic fever with heart involvement  s/p Mitral Valve Repair: 1988  H/O: Hysterectomy  History of Cholecystectomy  History of Cholecystectomy  s/p Mitral Valve Repair  H/O: Hysterectomy  History of Cholecystectomy  FH: Mitral Valve Repair  H/O: Hysterectomy  Adult Hypothyroidism  Atrial Fibrillation  HTN  Artificial cardiac pacemaker: 2009.  Pacemaker card on chart  S/P cholecystectomy  S/P mitral valve replacement with tissue valve: bovine 2009  At same time aortic and tricuspid valves were replaced.  S/P mitral valve repair: 1988  S/P hysterectomy      MEDICATIONS  (STANDING):  digoxin     Tablet 0.125 milliGRAM(s) Oral daily  furosemide    Tablet 40 milliGRAM(s) Oral daily  sodium chloride 0.9% lock flush 3 milliLiter(s) IV Push every 8 hours    MEDICATIONS  (PRN):  zolpidem 5 milliGRAM(s) Oral at bedtime PRN Insomnia            Vital Signs Last 24 Hrs  T(C): 36.8 (31 Jan 2019 15:48), Max: 37.1 (31 Jan 2019 11:37)  T(F): 98.2 (31 Jan 2019 15:48), Max: 98.8 (31 Jan 2019 11:37)  HR: 76 (31 Jan 2019 15:48) (71 - 76)  BP: 145/83 (31 Jan 2019 15:48) (108/50 - 145/83)  BP(mean): --  RR: 17 (31 Jan 2019 15:48) (17 - 18)  SpO2: 97% (31 Jan 2019 15:48) (96% - 99%)            INTERPRETATION OF TELEMETRY: AFib with V paced 70's bpm    ECG: V paced        LABS:                        12.0   12.35 )-----------( 278      ( 31 Jan 2019 06:05 )             37.7     01-31    136  |  98  |  18  ----------------------------<  110<H>  4.9   |  24  |  0.96    Ca    9.7      31 Jan 2019 06:05  Mg     2.2     01-31                BNP  RADIOLOGY & ADDITIONAL STUDIES:      PHYSICAL EXAM:    GENERAL: In no apparent distress, well nourished, and hydrated.  NECK: Supple and normal thyroid.  No JVD or carotid bruit.  Carotid pulse is 2+ bilaterally.  HEART: S1S2 irregularly irregular ; 2/6 systolic murmurs, no rubs, or gallops. Left side ACW incision site with steri strips dry intact, no active bleeding or hematoma   PULMONARY: Clear to auscultation and perfusion.  No rales, wheezing, or rhonchi bilaterally.  ABDOMEN: Soft, Nontender, Nondistended; Bowel sounds present  EXTREMITIES:  2+ Peripheral Pulses, No clubbing, cyanosis, or edema  NEUROLOGICAL: axo x3, mild R side weakness sister/Family

## 2019-03-29 ENCOUNTER — APPOINTMENT (OUTPATIENT)
Dept: NEUROLOGY | Facility: CLINIC | Age: 79
End: 2019-03-29
Payer: MEDICARE

## 2019-03-29 VITALS
DIASTOLIC BLOOD PRESSURE: 84 MMHG | HEART RATE: 82 BPM | BODY MASS INDEX: 26.21 KG/M2 | SYSTOLIC BLOOD PRESSURE: 138 MMHG | HEIGHT: 59 IN | WEIGHT: 130 LBS

## 2019-03-29 DIAGNOSIS — Z78.9 OTHER SPECIFIED HEALTH STATUS: ICD-10-CM

## 2019-03-29 DIAGNOSIS — I09.9 RHEUMATIC HEART DISEASE, UNSPECIFIED: ICD-10-CM

## 2019-03-29 DIAGNOSIS — Z82.49 FAMILY HISTORY OF ISCHEMIC HEART DISEASE AND OTHER DISEASES OF THE CIRCULATORY SYSTEM: ICD-10-CM

## 2019-03-29 PROCEDURE — 93886 INTRACRANIAL COMPLETE STUDY: CPT

## 2019-03-29 PROCEDURE — 99215 OFFICE O/P EST HI 40 MIN: CPT

## 2019-03-29 PROCEDURE — 93880 EXTRACRANIAL BILAT STUDY: CPT

## 2019-03-29 PROCEDURE — 93892 TCD EMBOLI DETECT W/O INJ: CPT

## 2019-03-29 RX ORDER — BACLOFEN 10 MG/1
10 TABLET ORAL TWICE DAILY
Refills: 0 | Status: ACTIVE | COMMUNITY

## 2019-04-12 NOTE — HISTORY OF PRESENT ILLNESS
[FreeTextEntry1] : This is a 78-year-old right-handed woman who experienced right-sided hemiparesis and dysarthria secondary to an intracerebral hemorrhage. She was admitted to [facility], and then discharged to [facility] inpatient rehabilitation, where she has stayed over 100 days. She was discharged around November 8, 2018.\par \par The patient continues to have weakness in the right arm and right leg, as well as swelling developing in the right leg and right hand. She also has episodes of severe pain starting in the right shoulder and radiating down the right arm, occurring about twice per week.\par \par She has not yet been started on anticoagulation, despite previously being on warfarin for atrial fibrillation and valve replacement.

## 2019-04-12 NOTE — PHYSICAL EXAM
[General Appearance - Alert] : alert [General Appearance - In No Acute Distress] : in no acute distress [Oriented To Time, Place, And Person] : oriented to person, place, and time [Impaired Insight] : insight and judgment were intact [Person] : oriented to person [Place] : oriented to place [Time] : oriented to time [Concentration Intact] : normal concentrating ability [Fluency] : fluency intact [Comprehension] : comprehension intact [Cranial Nerves Optic (II)] : visual acuity intact bilaterally,  visual fields full to confrontation, pupils equal round and reactive to light [Cranial Nerves Oculomotor (III)] : extraocular motion intact [Cranial Nerves Trigeminal (V)] : facial sensation intact symmetrically [Cranial Nerves Facial (VII)] : face symmetrical [Cranial Nerves Vestibulocochlear (VIII)] : hearing was intact bilaterally [Cranial Nerves Glossopharyngeal (IX)] : tongue and palate midline [Cranial Nerves Hypoglossal (XII)] : there was no tongue deviation with protrusion [Cranial Nerves Accessory (XI - Cranial And Spinal)] : head turning and shoulder shrug symmetric [Motor Handedness Right-Handed] : the patient is right hand dominant [Hemiparesis Of Right Side] : hemiparesis was present on the right [No Muscle Atrophy] : normal bulk in all four extremities [Hand Weakness Right] : the hand  was weak [Hand Weakness Left] : normal hand  [4] : knee extension 4/5 [5] : knee extension 5/5 [Sensation Tactile Decrease] : light touch was intact [Sensation Pain / Temperature Decrease] : pain and temperature was intact [Abnormal Walk] : normal gait [Balance] : balance was intact [Past-pointing] : there was no past-pointing [Tremor] : no tremor present [Coordination - Dysmetria Impaired Finger-to-Nose Bilateral] : not present [Coordination - Dysmetria Impaired Heel-to-Shin Bilateral] : not present [Dysdiadochokinesia Bilaterally] : not present [1+] : Ankle jerk right 1+ [Plantar Reflex Right Only] : normal on the right [2+] : Ankle jerk left 2+ [Plantar Reflex Left Only] : normal on the left [PERRL With Normal Accommodation] : pupils were equal in size, round, reactive to light, with normal accommodation [Sclera] : the sclera and conjunctiva were normal [Extraocular Movements] : extraocular movements were intact [Hearing Threshold Finger Rub Not Licking] : hearing was normal [Oropharynx] : the oropharynx was normal [Auscultation Breath Sounds / Voice Sounds] : lungs were clear to auscultation bilaterally [Arterial Pulses Carotid] : carotid pulses were normal with no bruits [FreeTextEntry1] : Irregularly irregular rhythm, Rate WNL [Full Pulse] : the pedal pulses are present [Abdomen Soft] : soft [Bowel Sounds] : normal bowel sounds [Abdomen Tenderness] : non-tender [No Spinal Tenderness] : no spinal tenderness [Skin Color & Pigmentation] : normal skin color and pigmentation [Nail Clubbing] : no clubbing  or cyanosis of the fingernails [] : no rash

## 2019-04-12 NOTE — DISCUSSION/SUMMARY
[FreeTextEntry1] : LOC Responsiveness: 0\par LOC Questions: 0\par LOC Commands: 0\par Gaze Preference: 0\par Visual Field: 0\par Facial Palsy: 0\par Motor Arm Right: 1\par Motor Arm Left: 0\par Motor Leg Right: 1\par Motor Arm Left: 0\par Limb Ataxia: 0\par Sensory: 0\par Language: 0\par Speech: 0\par Extinction/Inattention: 0\par \par NIHSS Total: 2\par MRS: 2\par

## 2019-04-12 NOTE — DATA REVIEWED
[No studies available for review at this time.] : No studies available for review at this time. [de-identified] : Labs (1/29/18):\par - WNL: Vit B12, Folate, CBC, CMP, UA, Coagulation panel, Fasting lipid panel\par - HbA1c 6.1% <H>\par \par Right hip x-ray (5/25/17):\par - S/p right total hip arthroplasty\par \par 2-view chest x-ray (6/23/16):\par - Cardiomegaly\par - Evidence of COPD\par \par Cardiac catheterization (5/8/09):\par - Nonobstructive coronary artery disease\par - Severe mitral stenosis\par - Normal LVEF (60%)\par - Moderate mitral and aortic regurgitation\par - Mild pulmonary hypertension

## 2019-04-12 NOTE — ASSESSMENT
[FreeTextEntry1] : 78 RHF with right-sided hemiparesis secondary to intracerebral hemorrhage secondary to anticoagulation use for atrial fibrillation, now resolved.

## 2019-06-10 NOTE — DISCHARGE NOTE ADULT - NURSING SECTION COMPLETE
Addended by: ERIN SALGUERO on: 6/10/2019 08:19 AM     Modules accepted: Orders     Patient/Caregiver provided printed discharge information.

## 2019-11-13 ENCOUNTER — APPOINTMENT (OUTPATIENT)
Dept: NEUROLOGY | Facility: CLINIC | Age: 79
End: 2019-11-13

## 2019-12-06 ENCOUNTER — APPOINTMENT (OUTPATIENT)
Dept: ELECTROPHYSIOLOGY | Facility: CLINIC | Age: 79
End: 2019-12-06
Payer: MEDICARE

## 2019-12-06 PROCEDURE — 93294 REM INTERROG EVL PM/LDLS PM: CPT

## 2019-12-06 PROCEDURE — 93296 REM INTERROG EVL PM/IDS: CPT

## 2020-03-12 ENCOUNTER — APPOINTMENT (OUTPATIENT)
Dept: ELECTROPHYSIOLOGY | Facility: CLINIC | Age: 80
End: 2020-03-12
Payer: MEDICARE

## 2020-03-12 PROCEDURE — 93294 REM INTERROG EVL PM/LDLS PM: CPT

## 2020-03-12 PROCEDURE — 93296 REM INTERROG EVL PM/IDS: CPT

## 2020-06-11 ENCOUNTER — APPOINTMENT (OUTPATIENT)
Dept: ELECTROPHYSIOLOGY | Facility: CLINIC | Age: 80
End: 2020-06-11
Payer: MEDICARE

## 2020-06-11 PROCEDURE — 93294 REM INTERROG EVL PM/LDLS PM: CPT

## 2020-06-11 PROCEDURE — 93296 REM INTERROG EVL PM/IDS: CPT

## 2020-09-21 ENCOUNTER — APPOINTMENT (OUTPATIENT)
Dept: ELECTROPHYSIOLOGY | Facility: CLINIC | Age: 80
End: 2020-09-21
Payer: MEDICARE

## 2020-09-21 PROCEDURE — 93296 REM INTERROG EVL PM/IDS: CPT

## 2020-09-21 PROCEDURE — 93294 REM INTERROG EVL PM/LDLS PM: CPT

## 2020-11-30 NOTE — PROVIDER CONTACT NOTE (OTHER) - SITUATION
Clovis Luna would like to thank Dr. Sekou Bourgeois for the privilege of caring for Janelle Joyce. He passed away at approximately 6:09am on 11/28/2020.
Pt. is confused, wanting to contact police, does not know where she is. Pt. had complaints overnight of blurred vision. Pt. states thought experiencing a TIA overnight that has since passed.

## 2020-12-24 ENCOUNTER — APPOINTMENT (OUTPATIENT)
Dept: ELECTROPHYSIOLOGY | Facility: CLINIC | Age: 80
End: 2020-12-24
Payer: MEDICARE

## 2020-12-24 PROCEDURE — 93296 REM INTERROG EVL PM/IDS: CPT

## 2020-12-24 PROCEDURE — 93294 REM INTERROG EVL PM/LDLS PM: CPT

## 2021-03-25 ENCOUNTER — APPOINTMENT (OUTPATIENT)
Dept: ELECTROPHYSIOLOGY | Facility: CLINIC | Age: 81
End: 2021-03-25

## 2021-05-11 ENCOUNTER — FORM ENCOUNTER (OUTPATIENT)
Age: 81
End: 2021-05-11

## 2022-04-24 ENCOUNTER — FORM ENCOUNTER (OUTPATIENT)
Age: 82
End: 2022-04-24

## 2024-01-01 NOTE — PATIENT PROFILE ADULT - VISION (WITH CORRECTIVE LENSES IF THE PATIENT USUALLY WEARS THEM):
Partially impaired: cannot see medication labels or newsprint, but can see obstacles in path, and the surrounding layout; can count fingers at arm's length Adequate: hears normal conversation without difficulty